# Patient Record
Sex: FEMALE | Race: BLACK OR AFRICAN AMERICAN | NOT HISPANIC OR LATINO | URBAN - METROPOLITAN AREA
[De-identification: names, ages, dates, MRNs, and addresses within clinical notes are randomized per-mention and may not be internally consistent; named-entity substitution may affect disease eponyms.]

---

## 2021-12-23 ENCOUNTER — OFFICE VISIT (OUTPATIENT)
Dept: URGENT CARE | Facility: CLINIC | Age: 31
End: 2021-12-23
Payer: COMMERCIAL

## 2021-12-23 VITALS
HEIGHT: 62 IN | HEART RATE: 82 BPM | WEIGHT: 210 LBS | BODY MASS INDEX: 38.64 KG/M2 | OXYGEN SATURATION: 98 % | TEMPERATURE: 98 F | RESPIRATION RATE: 16 BRPM

## 2021-12-23 DIAGNOSIS — Z11.59 SPECIAL SCREENING EXAMINATION FOR VIRAL DISEASE: Primary | ICD-10-CM

## 2021-12-23 PROCEDURE — 99203 OFFICE O/P NEW LOW 30 MIN: CPT | Performed by: FAMILY MEDICINE

## 2021-12-23 PROCEDURE — U0005 INFEC AGEN DETEC AMPLI PROBE: HCPCS | Performed by: FAMILY MEDICINE

## 2021-12-23 PROCEDURE — U0003 INFECTIOUS AGENT DETECTION BY NUCLEIC ACID (DNA OR RNA); SEVERE ACUTE RESPIRATORY SYNDROME CORONAVIRUS 2 (SARS-COV-2) (CORONAVIRUS DISEASE [COVID-19]), AMPLIFIED PROBE TECHNIQUE, MAKING USE OF HIGH THROUGHPUT TECHNOLOGIES AS DESCRIBED BY CMS-2020-01-R: HCPCS | Performed by: FAMILY MEDICINE

## 2021-12-24 LAB — SARS-COV-2 RNA RESP QL NAA+PROBE: POSITIVE

## 2022-08-16 ENCOUNTER — OFFICE VISIT (OUTPATIENT)
Dept: OBGYN CLINIC | Facility: CLINIC | Age: 32
End: 2022-08-16
Payer: COMMERCIAL

## 2022-08-16 VITALS — BODY MASS INDEX: 39.14 KG/M2 | SYSTOLIC BLOOD PRESSURE: 122 MMHG | WEIGHT: 214 LBS | DIASTOLIC BLOOD PRESSURE: 80 MMHG

## 2022-08-16 DIAGNOSIS — Z01.419 ENCNTR FOR GYN EXAM (GENERAL) (ROUTINE) W/O ABN FINDINGS: Primary | ICD-10-CM

## 2022-08-16 PROCEDURE — 99385 PREV VISIT NEW AGE 18-39: CPT | Performed by: NURSE PRACTITIONER

## 2022-08-16 PROCEDURE — 0503F POSTPARTUM CARE VISIT: CPT | Performed by: NURSE PRACTITIONER

## 2022-08-16 RX ORDER — DIPHENOXYLATE HYDROCHLORIDE AND ATROPINE SULFATE 2.5; .025 MG/1; MG/1
1 TABLET ORAL DAILY
COMMUNITY

## 2022-08-16 NOTE — PROGRESS NOTES
Assessment/Plan   Diagnoses and all orders for this visit:    Encntr for gyn exam (general) (routine) w/o abn findings  -     Thinprep Tis Pap and HPV mRNA E6/E7 Reflex HPV 16,18/45    Other orders  -     multivitamin (THERAGRAN) TABS; Take 1 tablet by mouth daily      Discussion  Reviewed with patient normal exam today  Pap with HPV done today  Reviewed can take a normal healthy couple up to one year to conceive  Recommend starting a prenatal with DHA  Discussed timing of intercourse with ovulation and OTC OPKs  Call with positive pregnancy test  Normal breast exam today  Monthly SBEs advised  Vitamin D and Calcim Supplements advised  Exercise most days of the week  Follow with PCP for regular check-ups and blood work  RTO 1 year for annual or sooner as needed  Subjective     Deepti Boles is a 32 y o  female new patient who presents for annual well woman exam    Last exam  2019 Pap normal denies any hx of abnormal pap smear in her past  Pap guidelines reviewed with patient  Pt would like Pap today  Pt denies any abnormal vaginal discharge, itching, or odor  Pt in a mutually exclusive relationship () with a male partner and denies the need for STD testing today  Menstrual Cycle:  LMP: 7/11/2022  Period Cycle (Days): 28  Period Duration (Days): 7  Period Pattern: Regular  Menstrual Flow: Light  Menstrual Control: Maxi pad  Dysmenorrhea: None  OB History     G 0   Contraception: None, TTC x 2 months  Practices monthly SBEs, no breast complaints today  Denies any bowel or bladder issues  Pt follows with PCP for regular check-ups and blood work  Review of Systems   Genitourinary: Negative  The following portions of the patient's history were reviewed and updated as appropriate: allergies, current medications, past family history, past medical history, past social history, past surgical history and problem list     History reviewed  No pertinent past medical history      Past Surgical History:   Procedure Laterality Date    NO PAST SURGERIES         History reviewed  No pertinent family history  Social History     Socioeconomic History    Marital status: /Civil Union     Spouse name: Not on file    Number of children: Not on file    Years of education: Not on file    Highest education level: Not on file   Occupational History    Not on file   Tobacco Use    Smoking status: Never Smoker    Smokeless tobacco: Never Used   Vaping Use    Vaping Use: Never used   Substance and Sexual Activity    Alcohol use: Not Currently    Drug use: Never    Sexual activity: Yes     Birth control/protection: None   Other Topics Concern    Not on file   Social History Narrative    Not on file     Social Determinants of Health     Financial Resource Strain: Not on file   Food Insecurity: Not on file   Transportation Needs: Not on file   Physical Activity: Not on file   Stress: Not on file   Social Connections: Not on file   Intimate Partner Violence: Not on file   Housing Stability: Not on file         Current Outpatient Medications:     multivitamin (THERAGRAN) TABS, Take 1 tablet by mouth daily, Disp: , Rfl:     No Known Allergies    Objective   Vitals:    08/16/22 1323   BP: 122/80   BP Location: Right arm   Patient Position: Sitting   Cuff Size: Large   Weight: 97 1 kg (214 lb)     Physical Exam  Vitals and nursing note reviewed  Constitutional:       Appearance: She is well-developed  HENT:      Head: Normocephalic  Neck:      Thyroid: No thyromegaly  Trachea: No tracheal deviation  Cardiovascular:      Rate and Rhythm: Normal rate and regular rhythm  Heart sounds: Normal heart sounds  Pulmonary:      Effort: Pulmonary effort is normal       Breath sounds: Normal breath sounds  Chest:   Breasts: Breasts are symmetrical       Right: No inverted nipple, mass, nipple discharge, skin change or tenderness        Left: No inverted nipple, mass, nipple discharge, skin change or tenderness  Abdominal:      General: Bowel sounds are normal  There is no distension  Palpations: Abdomen is soft  There is no mass  Tenderness: There is no abdominal tenderness  There is no guarding or rebound  Genitourinary:     Labia:         Right: No rash, tenderness, lesion or injury  Left: No rash, tenderness, lesion or injury  Vagina: Normal       Cervix: Normal       Uterus: Normal        Adnexa: Right adnexa normal and left adnexa normal         Right: No mass, tenderness or fullness  Left: No mass, tenderness or fullness  Musculoskeletal:         General: Normal range of motion  Cervical back: Normal range of motion and neck supple  Skin:     General: Skin is warm and dry  Neurological:      Mental Status: She is alert and oriented to person, place, and time  Psychiatric:         Behavior: Behavior normal          Thought Content:  Thought content normal          Judgment: Judgment normal

## 2022-08-18 LAB
CLINICAL INFO: NORMAL
CYTO CVX: NORMAL
CYTOLOGY CMNT CVX/VAG CYTO-IMP: NORMAL
DATE PREVIOUS BX: NORMAL
HPV E6+E7 MRNA CVX QL NAA+PROBE: NOT DETECTED
LMP START DATE: NORMAL
SL AMB PREV. PAP:: NORMAL
SPECIMEN SOURCE CVX/VAG CYTO: NORMAL

## 2022-10-12 ENCOUNTER — APPOINTMENT (OUTPATIENT)
Dept: RADIOLOGY | Facility: HOSPITAL | Age: 32
End: 2022-10-12
Payer: COMMERCIAL

## 2022-10-12 ENCOUNTER — HOSPITAL ENCOUNTER (EMERGENCY)
Facility: HOSPITAL | Age: 32
Discharge: HOME | End: 2022-10-12
Attending: EMERGENCY MEDICINE
Payer: COMMERCIAL

## 2022-10-12 VITALS
RESPIRATION RATE: 20 BRPM | DIASTOLIC BLOOD PRESSURE: 73 MMHG | HEART RATE: 78 BPM | TEMPERATURE: 97.1 F | SYSTOLIC BLOOD PRESSURE: 116 MMHG | OXYGEN SATURATION: 100 %

## 2022-10-12 DIAGNOSIS — M79.641 RIGHT HAND PAIN: Primary | ICD-10-CM

## 2022-10-12 LAB
EXT PREG TEST URINE: NEGATIVE
EXT. CONTROL ED NAV: NORMAL

## 2022-10-12 PROCEDURE — 73130 X-RAY EXAM OF HAND: CPT

## 2022-10-12 PROCEDURE — 81025 URINE PREGNANCY TEST: CPT | Performed by: PHYSICIAN ASSISTANT

## 2022-10-12 PROCEDURE — 99284 EMERGENCY DEPT VISIT MOD MDM: CPT | Performed by: PHYSICIAN ASSISTANT

## 2022-10-12 PROCEDURE — 99283 EMERGENCY DEPT VISIT LOW MDM: CPT

## 2022-10-12 NOTE — ED NOTES
Pt reported that she doesn't know if she is pregnant, preg test completed and it is negative  Xray made aware  Ronald SILVA notified        Doyle Aquino RN  10/12/22 6101

## 2022-10-12 NOTE — DISCHARGE INSTRUCTIONS
Motrin 400 mg every 6 hours with food as needed for pain     Follow up with hand orthopedist Dr Angelica Dominguez   Call for appointment    Use splint for comfort     Return to ED for increased pain, worsening symptoms

## 2022-10-12 NOTE — ED PROVIDER NOTES
History  Chief Complaint   Patient presents with   • Hand Pain     C/o R index finger pain for several months  Last night noted it more in hand as well with swelling, no known trauma     Patient is a 19-year-old black female with no pertinent past medical history who reports three month history of pain to the PIP joint the right 2nd finger  States she thought she has noticed some swelling to the right dorsal hand 2nd/3rd finger  webspace this morning  No trauma  No overuse  No fever  No other joint pain or swelling  Patient is right-hand dominant  She has no other complaints  Prior to Admission Medications   Prescriptions Last Dose Informant Patient Reported? Taking? Prenatal MV-Min-Fe Fum-FA-DHA (PRENATAL 1 PO)   Yes Yes   Sig: Take by mouth in the morning   multivitamin (THERAGRAN) TABS   Yes No   Sig: Take 1 tablet by mouth daily      Facility-Administered Medications: None       History reviewed  No pertinent past medical history  Past Surgical History:   Procedure Laterality Date   • NO PAST SURGERIES         History reviewed  No pertinent family history  I have reviewed and agree with the history as documented  E-Cigarette/Vaping   • E-Cigarette Use Never User      E-Cigarette/Vaping Substances   • Nicotine No    • THC No    • CBD No    • Flavoring No    • Other No    • Unknown No      Social History     Tobacco Use   • Smoking status: Never Smoker   • Smokeless tobacco: Never Used   Vaping Use   • Vaping Use: Never used   Substance Use Topics   • Alcohol use: Not Currently   • Drug use: Never       Review of Systems   Constitutional: Negative for chills and fever  HENT: Negative for ear pain and sore throat  Respiratory: Negative for cough and shortness of breath  Cardiovascular: Negative for chest pain and palpitations  Gastrointestinal: Negative for abdominal pain and vomiting  Genitourinary: Negative for dysuria and hematuria     Musculoskeletal: Positive for arthralgias  Negative for back pain and joint swelling  Skin: Negative for color change and rash  Neurological: Negative for numbness  All other systems reviewed and are negative  Physical Exam  Physical Exam  Vitals and nursing note reviewed  Constitutional:       General: She is not in acute distress  Appearance: Normal appearance  She is not ill-appearing, toxic-appearing or diaphoretic  HENT:      Head: Normocephalic and atraumatic  Nose: Nose normal       Mouth/Throat:      Mouth: Mucous membranes are moist       Pharynx: Oropharynx is clear  Eyes:      Extraocular Movements: Extraocular movements intact  Conjunctiva/sclera: Conjunctivae normal       Pupils: Pupils are equal, round, and reactive to light  Cardiovascular:      Rate and Rhythm: Normal rate and regular rhythm  Pulses: Normal pulses  Heart sounds: Normal heart sounds  Pulmonary:      Breath sounds: Normal breath sounds  Abdominal:      General: Abdomen is flat  Bowel sounds are normal       Palpations: Abdomen is soft  Musculoskeletal:      Cervical back: Normal range of motion and neck supple  Comments: Tenderness to palpation of the flexor aspect of the right 2nd MCP joint  There is no obvious right hand or fingers swelling, redness, warmth  Right hand is neurovascular intact  Skin:     General: Skin is warm and dry  Capillary Refill: Capillary refill takes less than 2 seconds  Neurological:      General: No focal deficit present  Mental Status: She is alert and oriented to person, place, and time  Mental status is at baseline           Vital Signs  ED Triage Vitals [10/12/22 1107]   Temperature Pulse Respirations Blood Pressure SpO2   (!) 97 1 °F (36 2 °C) 68 22 128/95 100 %      Temp Source Heart Rate Source Patient Position - Orthostatic VS BP Location FiO2 (%)   Tympanic Monitor Sitting Right arm --      Pain Score       No Pain           Vitals:    10/12/22 1107 10/12/22 1141 10/12/22 1234   BP: 128/95  116/73   Pulse: 68 74 78   Patient Position - Orthostatic VS: Sitting  Sitting         Visual Acuity      ED Medications  Medications - No data to display    Diagnostic Studies  Results Reviewed     Procedure Component Value Units Date/Time    POCT pregnancy, urine [[id number]]  (Normal) Resulted: 10/12/22 1129    Lab Status: Final result Updated: 10/12/22 1129     EXT PREG TEST UR (Ref: Negative) negative     Control valid                 XR hand 3+ views RIGHT   Final Result by Horacio Salomon MD (10/12 1256)      No acute osseous abnormality  Workstation performed: [id number]                    Procedures  Procedures         ED Course                               SBIRT 22yo+    Flowsheet Row Most Recent Value   SBIRT (23 yo +)    In order to provide better care to our patients, we are screening all of our patients for alcohol and drug use  Would it be okay to ask you these screening questions? No Filed at: 10/12/2022 1115                    MDM  Number of Diagnoses or Management Options  Right hand pain: new and requires workup  Diagnosis management comments: 31-year-old black female with 3 month history of pain to the right 2nd finger PIP joint  Presents with complaint of possible swelling to the right dorsal hand  On physical exam, patient is tender to the flexor aspect of the right 2nd MCP joint  Differential diagnosis includes tendinitis, arthritis  No acute osseous abnormality on x-ray    Patient was advised NSAIDs and will give Hand Ortho for follow-up       Amount and/or Complexity of Data Reviewed  Tests in the radiology section of CPT®: reviewed  Decide to obtain previous medical records or to obtain history from someone other than the patient: yes  Review and summarize past medical records: yes  Independent visualization of images, tracings, or specimens: yes    Risk of Complications, Morbidity, and/or Mortality  Presenting problems: low  Diagnostic procedures: low  Management options: low    Patient Progress  Patient progress: stable      Disposition  Final diagnoses:   Right hand pain     Time reflects when diagnosis was documented in both MDM as applicable and the Disposition within this note     Time User Action Codes Description Comment    10/12/2022 12:26 PM Jessica Sid Murray [[id number]] Right hand pain       ED Disposition     ED Disposition   Discharge    Condition   Stable    Date/Time   Wed Oct 12, 2022 12:25 PM    Anastacio Mccray discharge to home/self care  Follow-up Information     Follow up With Specialties Details Why [address], MD Orthopedic Surgery, Hand Surgery   [address]  [phone]            Discharge Medication List as of 10/12/2022 12:26 PM      CONTINUE these medications which have NOT CHANGED    Details   Prenatal MV-Min-Fe Fum-FA-DHA (PRENATAL 1 PO) Take by mouth in the morning, Historical Med      multivitamin (THERAGRAN) TABS Take 1 tablet by mouth daily, Historical Med             No discharge procedures on file      PDMP Review     None          ED Provider  Electronically Signed by           Maurisio Cedillo PA-C  10/12/22 9703

## 2024-12-16 ENCOUNTER — OFFICE VISIT (OUTPATIENT)
Dept: FAMILY MEDICINE CLINIC | Facility: CLINIC | Age: 34
End: 2024-12-16
Payer: COMMERCIAL

## 2024-12-16 VITALS
BODY MASS INDEX: 42.63 KG/M2 | SYSTOLIC BLOOD PRESSURE: 126 MMHG | HEIGHT: 61 IN | DIASTOLIC BLOOD PRESSURE: 82 MMHG | TEMPERATURE: 97.8 F | WEIGHT: 225.8 LBS | HEART RATE: 84 BPM | RESPIRATION RATE: 18 BRPM

## 2024-12-16 DIAGNOSIS — Z00.00 ROUTINE ADULT HEALTH MAINTENANCE: Primary | ICD-10-CM

## 2024-12-16 DIAGNOSIS — Z13.0 SCREENING FOR DEFICIENCY ANEMIA: ICD-10-CM

## 2024-12-16 DIAGNOSIS — Z13.6 SCREENING FOR CARDIOVASCULAR CONDITION: ICD-10-CM

## 2024-12-16 DIAGNOSIS — Z11.59 NEED FOR HEPATITIS C SCREENING TEST: ICD-10-CM

## 2024-12-16 DIAGNOSIS — Z13.1 SCREENING FOR DIABETES MELLITUS: ICD-10-CM

## 2024-12-16 DIAGNOSIS — Z13.29 SCREENING FOR THYROID DISORDER: ICD-10-CM

## 2024-12-16 PROCEDURE — 99385 PREV VISIT NEW AGE 18-39: CPT | Performed by: NURSE PRACTITIONER

## 2024-12-16 RX ORDER — FOLIC ACID/INOSITOL 200-2000MG
POWDER IN PACKET (EA) ORAL
COMMUNITY

## 2024-12-16 RX ORDER — UBIDECARENONE 200 MG
CAPSULE ORAL
COMMUNITY

## 2024-12-16 NOTE — PATIENT INSTRUCTIONS
"Patient Education     Routine physical for adults   The Basics   Written by the doctors and editors at [facility]   What is a physical? -- A physical is a routine visit, or \"check-up,\" with your doctor. You might also hear it called a \"wellness visit\" or \"preventive visit.\"  During each visit, the doctor will:   Ask about your physical and mental health   Ask about your habits, behaviors, and lifestyle   Do an exam   Give you vaccines if needed   Talk to you about any medicines you take   Give advice about your health   Answer your questions  Getting regular check-ups is an important part of taking care of your health. It can help your doctor find and treat any problems you have. But it's also important for preventing health problems.  A routine physical is different from a \"sick visit.\" A sick visit is when you see a doctor because of a health concern or problem. Since physicals are scheduled ahead of time, you can think about what you want to ask the doctor.  How often should I get a physical? -- It depends on your age and health. In general, for people age 21 years and older:   If you are younger than 50 years, you might be able to get a physical every 3 years.   If you are 50 years or older, your doctor might recommend a physical every year.  If you have an ongoing health condition, like diabetes or high blood pressure, your doctor will probably want to see you more often.  What happens during a physical? -- In general, each visit will include:   Physical exam - The doctor or nurse will check your height, weight, heart rate, and blood pressure. They will also look at your eyes and ears. They will ask about how you are feeling and whether you have any symptoms that bother you.   Medicines - It's a good idea to bring a list of all the medicines you take to each doctor visit. Your doctor will talk to you about your medicines and answer any questions. Tell them if you are having any side effects that bother you. You " "should also tell them if you are having trouble paying for any of your medicines.   Habits and behaviors - This includes:   Your diet   Your exercise habits   Whether you smoke, drink alcohol, or use drugs   Whether you are sexually active   Whether you feel safe at home  Your doctor will talk to you about things you can do to improve your health and lower your risk of health problems. They will also offer help and support. For example, if you want to quit smoking, they can give you advice and might prescribe medicines. If you want to improve your diet or get more physical activity, they can help you with this, too.   Lab tests, if needed - The tests you get will depend on your age and situation. For example, your doctor might want to check your:   Cholesterol   Blood sugar   Iron level   Vaccines - The recommended vaccines will depend on your age, health, and what vaccines you already had. Vaccines are very important because they can prevent certain serious or deadly infections.   Discussion of screening - \"Screening\" means checking for diseases or other health problems before they cause symptoms. Your doctor can recommend screening based on your age, risk, and preferences. This might include tests to check for:   Cancer, such as breast, prostate, cervical, ovarian, colorectal, prostate, lung, or skin cancer   Sexually transmitted infections, such as chlamydia and gonorrhea   Mental health conditions like depression and anxiety  Your doctor will talk to you about the different types of screening tests. They can help you decide which screenings to have. They can also explain what the results might mean.   Answering questions - The physical is a good time to ask the doctor or nurse questions about your health. If needed, they can refer you to other doctors or specialists, too.  Adults older than 65 years often need other care, too. As you get older, your doctor will talk to you about:   How to prevent falling at " home   Hearing or vision tests   Memory testing   How to take your medicines safely   Making sure that you have the help and support you need at home  All topics are updated as new evidence becomes available and our peer review process is complete.  This topic retrieved from [employer] on: May 02, 2024.  Topic 899515 Version 1.0  Release: 32.4.3 - C32.122  © 2024 UpToDate, Inc. and/or its affiliates. All rights reserved.  Consumer Information Use and Disclaimer   Disclaimer: This generalized information is a limited summary of diagnosis, treatment, and/or medication information. It is not meant to be comprehensive and should be used as a tool to help the user understand and/or assess potential diagnostic and treatment options. It does NOT include all information about conditions, treatments, medications, side effects, or risks that may apply to a specific patient. It is not intended to be medical advice or a substitute for the medical advice, diagnosis, or treatment of a health care provider based on the health care provider's examination and assessment of a patient's specific and unique circumstances. Patients must speak with a health care provider for complete information about their health, medical questions, and treatment options, including any risks or benefits regarding use of medications. This information does not endorse any treatments or medications as safe, effective, or approved for treating a specific patient. UpToDate, Inc. and its affiliates disclaim any warranty or liability relating to this information or the use thereof.The use of this information is governed by the Terms of Use, available at https://www.woltersIntenseuwer.com/en/know/clinical-effectiveness-terms. 2024© UpToDate, Inc. and its affiliates and/or licensors. All rights reserved.  Copyright   © 2024 Up[employer]Date, Inc. and/or its affiliates. All rights reserved.

## 2024-12-16 NOTE — PROGRESS NOTES
Adult Annual Physical  Name: Catrina Lewis      : 1990      MRN: [id number]  Encounter Provider: MECHE Valdez  Encounter Date: 2024   Encounter department: [facility]  Chief Complaint   Patient presents with   • Establish Care     Noemy Caruso MA       Assessment & Plan  Routine adult health maintenance         Screening for diabetes mellitus    Orders:  •  Comprehensive metabolic panel; Future    Screening for deficiency anemia    Orders:  •  CBC; Future    Screening for thyroid disorder    Orders:  •  TSH, 3rd generation with Free T4 reflex; Future    Screening for cardiovascular condition    Orders:  •  Lipid Panel with Direct LDL reflex; Future    Need for hepatitis C screening test    Orders:  •  Hepatitis C antibody; Future    Immunizations and preventive care screenings were discussed with patient today. Appropriate education was printed on patient's after visit summary.    Counseling:  Alcohol/drug use: discussed moderation in alcohol intake, the recommendations for healthy alcohol use, and avoidance of illicit drug use.  Dental Health: discussed importance of regular tooth brushing, flossing, and dental visits.  Injury prevention: discussed safety/seat belts, safety helmets, smoke detectors, carbon monoxide detectors, and smoking near bedding or upholstery.  Sexual health: discussed sexually transmitted diseases, partner selection, use of condoms, avoidance of unintended pregnancy, and contraceptive alternatives.  Exercise: the importance of regular exercise/physical activity was discussed. Recommend exercise 3-5 times per week for at least 30 minutes.          History of Present Illness     Adult Annual Physical:  Patient presents for annual physical. New to practice.  Personal and family medical history reviewed  Under care of gynecologist at [place] and had hysteroscopy couple of months for uterine polyp.  Counseled to follow with dentist  Stated that uptodate on  "Tdap vaccine.     Diet and Physical Activity:  - Diet/Nutrition: well balanced diet.  - Exercise: no formal exercise.    Depression Screening:  - PHQ-2 Score: 0    General Health:  - Sleep: sleeps well.  - Hearing: normal hearing bilateral ears.  - Vision: no vision problems, goes for regular eye exams and wears glasses.  - Dental: no dental visits for > 1 year and brushes teeth twice daily. occasioinally floss    /GYN Health:  - Follows with GYN: yes.     Review of Systems   Constitutional: Negative.    HENT: Negative.     Eyes: Negative.    Respiratory: Negative.     Cardiovascular: Negative.    Gastrointestinal: Negative.    Endocrine: Negative.    Genitourinary: Negative.    Musculoskeletal: Negative.    Skin: Negative.    Allergic/Immunologic: Negative.    Neurological: Negative.    Hematological: Negative.    Psychiatric/Behavioral: Negative.       Current Outpatient Medications on File Prior to Visit   Medication Sig Dispense Refill   • Coenzyme Q10 (CoQ-10) 200 MG CAPS      • Inositol-Folic Acid (Pregnitude) 2000-200 MG-MCG PACK      • Prenatal MV-Min-Fe Fum-FA-DHA (PRENATAL 1 PO) Take by mouth in the morning     • [DISCONTINUED] multivitamin (THERAGRAN) TABS Take 1 tablet by mouth daily       No current facility-administered medications on file prior to visit.        Objective   /82   Pulse 84   Temp 97.8 °F (36.6 °C)   Resp 18   Ht 5' 1.25\" (1.556 m)   Wt 102 kg (225 lb 12.8 oz)   BMI 42.32 kg/m²   Vision Screening    Right eye Left eye Both eyes   Without correction      With correction 20/15 20/15 20/20      Physical Exam  Vitals and nursing note reviewed.   Constitutional:       General: She is not in acute distress.     Appearance: She is well-developed. She is obese.   HENT:      Head: Normocephalic and atraumatic.      Salivary Glands: Right salivary gland is not diffusely enlarged or tender. Left salivary gland is not diffusely enlarged or tender.      Right Ear: Tympanic membrane, ear " canal and external ear normal.      Left Ear: Tympanic membrane, ear canal and external ear normal.      Nose: Nose normal. No nasal tenderness or mucosal edema.      Right Sinus: No maxillary sinus tenderness or frontal sinus tenderness.      Left Sinus: No maxillary sinus tenderness or frontal sinus tenderness.      Mouth/Throat:      Lips: Pink.      Mouth: Mucous membranes are moist.      Dentition: Normal dentition. No dental tenderness.      Tongue: No lesions.      Pharynx: Oropharynx is clear. No pharyngeal swelling, oropharyngeal exudate, posterior oropharyngeal erythema or uvula swelling.   Eyes:      General: Lids are normal.         Right eye: No discharge or hordeolum.         Left eye: No discharge or hordeolum.      Conjunctiva/sclera: Conjunctivae normal.      Right eye: Right conjunctiva is not injected.      Left eye: Left conjunctiva is not injected.   Neck:      Thyroid: No thyromegaly or thyroid tenderness.   Cardiovascular:      Rate and Rhythm: Normal rate and regular rhythm.      Pulses: Normal pulses.      Heart sounds: Normal heart sounds, S1 normal and S2 normal. No murmur heard.  Pulmonary:      Effort: Pulmonary effort is normal. No respiratory distress.      Breath sounds: Normal breath sounds.   Chest:      Chest wall: No mass, lacerations, deformity, swelling, tenderness, crepitus or edema. There is no dullness to percussion.   Abdominal:      General: Abdomen is flat. Bowel sounds are normal.      Palpations: Abdomen is soft.      Tenderness: There is no abdominal tenderness.      Hernia: There is no hernia in the umbilical area or ventral area.   Genitourinary:     Comments:  and breast exam deferred as follows with gynecologist  Musculoskeletal:         General: No swelling or tenderness. Normal range of motion.      Cervical back: Neck supple.      Right lower leg: No edema.      Left lower leg: No edema.   Lymphadenopathy:      Cervical:      Right cervical: No superficial or  posterior cervical adenopathy.     Left cervical: No superficial or posterior cervical adenopathy.      Upper Body:      Right upper body: No supraclavicular or axillary adenopathy.      Left upper body: No supraclavicular or axillary adenopathy.   Skin:     General: Skin is warm and dry.   Neurological:      General: No focal deficit present.      Mental Status: She is alert and oriented to person, place, and time.      GCS: GCS eye subscore is 4. GCS verbal subscore is 5. GCS motor subscore is 6.      Sensory: Sensation is intact.      Motor: Motor function is intact.      Coordination: Coordination is intact.      Gait: Gait is intact.      Deep Tendon Reflexes: Reflexes are normal and symmetric.   Psychiatric:         Attention and Perception: Attention normal.         Mood and Affect: Mood normal.         Speech: Speech normal.         Behavior: Behavior normal. Behavior is cooperative.         Thought Content: Thought content normal.         Cognition and Memory: Cognition normal.         Judgment: Judgment normal.

## 2024-12-25 LAB
ALBUMIN SERPL-MCNC: 4.4 G/DL (ref 3.9–4.9)
ALP SERPL-CCNC: 111 IU/L (ref 44–121)
ALT SERPL-CCNC: 26 IU/L (ref 0–32)
AST SERPL-CCNC: 26 IU/L (ref 0–40)
BILIRUB SERPL-MCNC: 0.4 MG/DL (ref 0–1.2)
BUN SERPL-MCNC: 12 MG/DL (ref 6–20)
BUN/CREAT SERPL: 12 (ref 9–23)
CALCIUM SERPL-MCNC: 9.6 MG/DL (ref 8.7–10.2)
CHLORIDE SERPL-SCNC: 104 MMOL/L (ref 96–106)
CHOLEST SERPL-MCNC: 278 MG/DL (ref 100–199)
CO2 SERPL-SCNC: 24 MMOL/L (ref 20–29)
CREAT SERPL-MCNC: 1.02 MG/DL (ref 0.57–1)
EGFR: 74 ML/MIN/1.73
ERYTHROCYTE [DISTWIDTH] IN BLOOD BY AUTOMATED COUNT: 12.3 % (ref 11.7–15.4)
GLOBULIN SER-MCNC: 3.1 G/DL (ref 1.5–4.5)
GLUCOSE SERPL-MCNC: 109 MG/DL (ref 70–99)
HCT VFR BLD AUTO: 41 % (ref 34–46.6)
HCV AB S/CO SERPL IA: NON REACTIVE
HDLC SERPL-MCNC: 45 MG/DL
HGB BLD-MCNC: 13.2 G/DL (ref 11.1–15.9)
LDL CALC COMMENT: ABNORMAL
LDLC SERPL CALC-MCNC: 203 MG/DL (ref 0–99)
LDLC/HDLC SERPL: 4.5 RATIO (ref 0–3.2)
MCH RBC QN AUTO: 28.8 PG (ref 26.6–33)
MCHC RBC AUTO-ENTMCNC: 32.2 G/DL (ref 31.5–35.7)
MCV RBC AUTO: 89 FL (ref 79–97)
MICRODELETION SYND BLD/T FISH: NORMAL
PLATELET # BLD AUTO: 295 X10E3/UL (ref 150–450)
POTASSIUM SERPL-SCNC: 4.2 MMOL/L (ref 3.5–5.2)
PROT SERPL-MCNC: 7.5 G/DL (ref 6–8.5)
RBC # BLD AUTO: 4.59 X10E6/UL (ref 3.77–5.28)
SL AMB T4, FREE (DIRECT): 1.19 NG/DL (ref 0.82–1.77)
SL AMB VLDL CHOLESTEROL CALC: 30 MG/DL (ref 5–40)
SODIUM SERPL-SCNC: 142 MMOL/L (ref 134–144)
TRIGL SERPL-MCNC: 158 MG/DL (ref 0–149)
TSH SERPL DL<=0.005 MIU/L-ACNC: 4.68 UIU/ML (ref 0.45–4.5)
WBC # BLD AUTO: 6.5 X10E3/UL (ref 3.4–10.8)

## 2024-12-26 ENCOUNTER — RESULTS FOLLOW-UP (OUTPATIENT)
Dept: FAMILY MEDICINE CLINIC | Facility: CLINIC | Age: 34
End: 2024-12-26

## 2024-12-26 DIAGNOSIS — R79.89 ELEVATED SERUM CREATININE: ICD-10-CM

## 2024-12-26 DIAGNOSIS — R79.89 ELEVATED TSH: Primary | ICD-10-CM

## 2024-12-26 DIAGNOSIS — E78.5 DYSLIPIDEMIA: ICD-10-CM

## 2024-12-26 RX ORDER — LEVOTHYROXINE SODIUM 25 UG/1
25 TABLET ORAL
Qty: 100 TABLET | Refills: 3 | Status: SHIPPED | OUTPATIENT
Start: 2024-12-26

## 2025-02-07 ENCOUNTER — OFFICE VISIT (OUTPATIENT)
Dept: URGENT CARE | Facility: CLINIC | Age: 35
End: 2025-02-07
Payer: COMMERCIAL

## 2025-02-07 VITALS
SYSTOLIC BLOOD PRESSURE: 114 MMHG | HEART RATE: 80 BPM | DIASTOLIC BLOOD PRESSURE: 74 MMHG | OXYGEN SATURATION: 100 % | WEIGHT: 227 LBS | TEMPERATURE: 97.4 F | RESPIRATION RATE: 16 BRPM | BODY MASS INDEX: 42.54 KG/M2

## 2025-02-07 DIAGNOSIS — J02.9 SORE THROAT: Primary | ICD-10-CM

## 2025-02-07 LAB — S PYO AG THROAT QL: NEGATIVE

## 2025-02-07 PROCEDURE — 87880 STREP A ASSAY W/OPTIC: CPT | Performed by: PREVENTIVE MEDICINE

## 2025-02-07 PROCEDURE — 99213 OFFICE O/P EST LOW 20 MIN: CPT | Performed by: PREVENTIVE MEDICINE

## 2025-02-07 NOTE — PROGRESS NOTES
[facility]        NAME: Catrina Lewis is a 34 y.o. female  : 1990    MRN: [id number]  DATE: 2025  TIME: 8:33 AM    Assessment and Plan   Sore throat [J02.9]  1. Sore throat  POCT rapid ANTIGEN strepA            Patient Instructions       Follow up with PCP in 3-5 days.  Proceed to  ER if symptoms worsen.    If tests have been performed at [facility], our office will contact you with results if changes need to be made to the care plan discussed with you at the visit.  [facility]    Chief Complaint     Chief Complaint   Patient presents with    Sore Throat     Pt c/o sore throat for 1 week         History of Present Illness       Sore throat x 1 week.  Very minimal cough no fever    Sore Throat         Review of Systems   Review of Systems   HENT:  Positive for sore throat.          Current Medications       Current Outpatient Medications:     Inositol-Folic Acid (Pregnitude) 2000-200 MG-MCG PACK, , Disp: , Rfl:     levothyroxine 25 mcg tablet, Take 1 tablet (25 mcg total) by mouth daily in the early morning, Disp: 100 tablet, Rfl: 3    Prenatal MV-Min-Fe Fum-FA-DHA (PRENATAL 1 PO), Take by mouth in the morning, Disp: , Rfl:     Coenzyme Q10 (CoQ-10) 200 MG CAPS, , Disp: , Rfl:     Current Allergies     Allergies as of 2025    (No Known Allergies)            The following portions of the patient's history were reviewed and updated as appropriate: allergies, current medications, past family history, past medical history, past social history, past surgical history and problem list.     Past Medical History:   Diagnosis Date    No pertinent past medical history        Past Surgical History:   Procedure Laterality Date    HYSTEROSCOPY      NO PAST SURGERIES         Family History   Problem Relation Age of Onset    No Known Problems Mother     Hypertension Father     Diabetes Father     Other (smoker) Father     Fibroids Sister           Medications have been verified.        Objective   /74   Pulse 80   Temp (!) 97.4 °F (36.3 °C) (Temporal)   Resp 16   Wt 103 kg (227 lb)   SpO2 100%   BMI 42.54 kg/m²   No LMP recorded.       Physical Exam     Physical Exam  HENT:      Mouth/Throat:      Mouth: Mucous membranes are moist. No oral lesions.      Pharynx: Oropharynx is clear. Posterior oropharyngeal erythema present. No pharyngeal swelling, oropharyngeal exudate or uvula swelling.     Rapid strep negative

## 2025-04-14 ENCOUNTER — OFFICE VISIT (OUTPATIENT)
Dept: CARDIOLOGY CLINIC | Facility: CLINIC | Age: 35
End: 2025-04-14
Payer: COMMERCIAL

## 2025-04-14 VITALS
HEIGHT: 61 IN | OXYGEN SATURATION: 98 % | SYSTOLIC BLOOD PRESSURE: 114 MMHG | BODY MASS INDEX: 43.61 KG/M2 | WEIGHT: 231 LBS | DIASTOLIC BLOOD PRESSURE: 68 MMHG | HEART RATE: 81 BPM

## 2025-04-14 DIAGNOSIS — R06.02 EXERTIONAL SHORTNESS OF BREATH: Primary | ICD-10-CM

## 2025-04-14 DIAGNOSIS — E78.5 HYPERLIPIDEMIA, UNSPECIFIED HYPERLIPIDEMIA TYPE: ICD-10-CM

## 2025-04-14 DIAGNOSIS — E78.2 ELEVATED TRIGLYCERIDES WITH HIGH CHOLESTEROL: ICD-10-CM

## 2025-04-14 PROCEDURE — 93000 ELECTROCARDIOGRAM COMPLETE: CPT | Performed by: INTERNAL MEDICINE

## 2025-04-14 PROCEDURE — 99203 OFFICE O/P NEW LOW 30 MIN: CPT | Performed by: INTERNAL MEDICINE

## 2025-04-14 RX ORDER — OMEGA-3-ACID ETHYL ESTERS 1 G/1
2 CAPSULE, LIQUID FILLED ORAL DAILY
Qty: 360 CAPSULE | Refills: 1 | Status: SHIPPED | OUTPATIENT
Start: 2025-04-14

## 2025-04-14 RX ORDER — OMEGA-3S/DHA/EPA/FISH OIL/D3 300MG-1000
400 CAPSULE ORAL DAILY
COMMUNITY

## 2025-04-14 RX ORDER — ATORVASTATIN CALCIUM 20 MG/1
20 TABLET, FILM COATED ORAL
Qty: 90 TABLET | Refills: 3 | Status: SHIPPED | OUTPATIENT
Start: 2025-04-14

## 2025-04-14 NOTE — PROGRESS NOTES
[facility]  [id number]   [place] [address]  Cardiology Consultation    Catrina Lewis  [id number]  1990      Consult for: Hyperlipidemia  Appreciate consult by: MECHE Valdez    1. Exertional shortness of breath        2. Hyperlipidemia, unspecified hyperlipidemia type  POCT ECG    atorvastatin (LIPITOR) 20 mg tablet    omega-3-acid ethyl esters (LOVAZA) 1 g capsule      3. Elevated triglycerides with high cholesterol  atorvastatin (LIPITOR) 20 mg tablet    omega-3-acid ethyl esters (LOVAZA) 1 g capsule    Stress test only, exercise    Lipid Panel With Apolipoprotein B (ApoB)    Lipid Panel With Apolipoprotein B (ApoB)         Discussion/Summary:   Assessment  - Elevated LDL cholesterol, likely familial hypercholesterolemia  - Discussed potential for genetic testing and familial implications  - Plan for aggressive cholesterol management    Recommendations  - Start atorvastatin 20 mg daily  - Increase omega-3 intake to 4,000 mg/day, consider prescription-grade if affordable  - Reduce carbohydrate intake to less than 100 grams per day  - Implement dietary changes and lifestyle modifications  - Schedule a stress test to assess cardiovascular response to exercise  - Follow up in six months to re-evaluate lipid levels and monitor treatment response  - Discuss potential impact on conception and pregnancy management, ensuring no medication interactions with current supplements    HPI:   The patient reports a history of elevated cholesterol, with the most recent LDL cholesterol measured at 203 mg/dL. They mention that their cholesterol has always been high, even when previously athletic. The patient has a sedentary job and reports a lack of exercise due to schedule constraints and fatigue. They do not smoke or consume alcohol regularly. The patient follows a diet with a high intake of trans fats, common in [place] cuisine, and has been trying to improve  dietary habits. There is no history of sleep apnea symptoms, leg swelling, or significant chest discomfort with routine activities, though they experience mild shortness of breath when running or walking fast on a treadmill.    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  - No smoking history  - Minimal alcohol consumption        Family Hx  Father- htn- 40s    Past Medical History:   Diagnosis Date    No pertinent past medical history      Social History     Socioeconomic History    Marital status: /Civil Union     Spouse name: Not on file    Number of children: Not on file    Years of education: Not on file    Highest education level: Not on file   Occupational History    Not on file   Tobacco Use    Smoking status: Never    Smokeless tobacco: Never   Vaping Use    Vaping status: Never Used   Substance and Sexual Activity    Alcohol use: Not Currently    Drug use: Never    Sexual activity: Yes     Birth control/protection: None   Other Topics Concern    Not on file   Social History Narrative    Not on file     Social Drivers of Health     Financial Resource Strain: Not on file   Food Insecurity: Not on file   Transportation Needs: Not on file   Physical Activity: Not on file   Stress: Not on file   Social Connections: Not on file   Intimate Partner Violence: Not on file   Housing Stability: Not on file      Family History   Problem Relation Age of Onset    No Known Problems Mother     Hypertension Father     Diabetes Father     Other (smoker) Father     Fibroids Sister      Past Surgical History:   Procedure Laterality Date    HYSTEROSCOPY      NO PAST SURGERIES         Current Outpatient Medications:     Coenzyme Q10 (CoQ-10) 200 MG CAPS, , Disp: , Rfl:     Inositol-Folic Acid (Pregnitude) 2000-200 MG-MCG PACK, , Disp: , Rfl:     levothyroxine 25 mcg tablet, Take 1 tablet (25 mcg total) by mouth daily in the early morning, Disp: 100 tablet, Rfl: 3    Multiple Vitamins-Minerals  "(ZINC PO), Take by mouth, Disp: , Rfl:     Prenatal MV-Min-Fe Fum-FA-DHA (PRENATAL 1 PO), Take by mouth in the morning, Disp: , Rfl:     cholecalciferol (VITAMIN D3) 400 units tablet, Take 400 Units by mouth daily, Disp: , Rfl:   No Known Allergies  Vitals:    04/14/25 1500   BP: 114/68   BP Location: Right arm   Patient Position: Sitting   Cuff Size: Large   Pulse: 81   SpO2: 98%   Weight: 105 kg (231 lb)   Height: 5' 1.25\" (1.556 m)       Review of Systems:   Review of Systems   Constitutional: Negative.  Negative for activity change, appetite change, chills, diaphoresis, fatigue, fever and unexpected weight change.   HENT: Negative.  Negative for congestion, dental problem, drooling, ear discharge, ear pain, facial swelling, hearing loss, mouth sores, nosebleeds, postnasal drip, rhinorrhea, sinus pressure, sinus pain, sneezing, sore throat, tinnitus, trouble swallowing and voice change.    Eyes: Negative.  Negative for photophobia, pain, redness, itching and visual disturbance.   Respiratory:  Positive for shortness of breath. Negative for apnea, cough, choking, chest tightness, wheezing and stridor.    Cardiovascular: Negative.  Negative for chest pain, palpitations and leg swelling.   Gastrointestinal: Negative.  Negative for abdominal distention, abdominal pain, anal bleeding, blood in stool, constipation, diarrhea, nausea, rectal pain and vomiting.   Endocrine: Negative.  Negative for cold intolerance, heat intolerance, polydipsia, polyphagia and polyuria.   Genitourinary: Negative.  Negative for decreased urine volume, difficulty urinating, dyspareunia, dysuria, enuresis, flank pain, frequency, genital sores, hematuria, menstrual problem, pelvic pain, urgency, vaginal bleeding, vaginal discharge and vaginal pain.   Musculoskeletal: Negative.  Negative for arthralgias, back pain, gait problem, joint swelling, myalgias, neck pain and neck stiffness.   Skin: Negative.  Negative for color change, pallor, rash " "and wound.   Allergic/Immunologic: Negative.  Negative for environmental allergies, food allergies and immunocompromised state.   Neurological: Negative.  Negative for dizziness, tremors, seizures, syncope, facial asymmetry, speech difficulty, weakness, light-headedness, numbness and headaches.   Hematological: Negative.  Negative for adenopathy. Does not bruise/bleed easily.   Psychiatric/Behavioral: Negative.  Negative for agitation, behavioral problems, confusion, decreased concentration, dysphoric mood, hallucinations, self-injury, sleep disturbance and suicidal ideas. The patient is not nervous/anxious and is not hyperactive.    All other systems reviewed and are negative.      Vitals:    04/14/25 1500   BP: 114/68   BP Location: Right arm   Patient Position: Sitting   Cuff Size: Large   Pulse: 81   SpO2: 98%   Weight: 105 kg (231 lb)   Height: 5' 1.25\" (1.556 m)     Physical Examination:   Physical Exam  Constitutional:       General: She is not in acute distress.     Appearance: She is well-developed. She is not diaphoretic.   HENT:      Head: Normocephalic and atraumatic.      Right Ear: External ear normal.      Left Ear: External ear normal.   Eyes:      General: No scleral icterus.        Right eye: No discharge.         Left eye: No discharge.      Conjunctiva/sclera: Conjunctivae normal.      Pupils: Pupils are equal, round, and reactive to light.   Neck:      Thyroid: No thyromegaly.      Vascular: No JVD.      Trachea: No tracheal deviation.   Cardiovascular:      Rate and Rhythm: Normal rate and regular rhythm.      Heart sounds: No murmur heard.     No friction rub. No gallop.   Pulmonary:      Effort: Pulmonary effort is normal. No respiratory distress.      Breath sounds: Normal breath sounds. No stridor. No wheezing or rales.   Chest:      Chest wall: No tenderness.   Abdominal:      General: Bowel sounds are normal. There is no distension.      Palpations: Abdomen is soft. There is no mass.      " "Tenderness: There is no abdominal tenderness. There is no guarding or rebound.   Musculoskeletal:         General: No tenderness or deformity. Normal range of motion.      Cervical back: Normal range of motion and neck supple.   Skin:     General: Skin is warm and dry.      Coloration: Skin is not pale.      Findings: No erythema or rash.   Neurological:      Mental Status: She is alert and oriented to person, place, and time.      Cranial Nerves: No cranial nerve deficit.      Motor: No abnormal muscle tone.      Coordination: Coordination normal.      Deep Tendon Reflexes: Reflexes are normal and symmetric. Reflexes normal.   Psychiatric:         Behavior: Behavior normal.         Thought Content: Thought content normal.         Judgment: Judgment normal.         Labs:     Lab Results   Component Value Date    WBC 6.5 12/24/2024    HGB 13.2 12/24/2024    HCT 41.0 12/24/2024    MCV 89 12/24/2024    RDW 12.3 12/24/2024     12/24/2024     BMP:  Lab Results   Component Value Date    SODIUM 142 12/24/2024    K 4.2 12/24/2024     12/24/2024    CO2 24 12/24/2024    BUN 12 12/24/2024    CREATININE 1.02 (H) 12/24/2024    GLUC 109 (H) 12/24/2024    EGFR 74 12/24/2024     LFT:  Lab Results   Component Value Date    AST 26 12/24/2024    ALT 26 12/24/2024    TP 7.5 12/24/2024    ALB 4.4 12/24/2024      No results found for: \"[id number]\"  No results found for: \"HGBA1C\"  Lipid Profile:   Lab Results   Component Value Date    CHOLESTEROL 278 (H) 12/24/2024    HDL 45 12/24/2024    LDLCALC 203 (H) 12/24/2024    TRIG 158 (H) 12/24/2024     Lab Results   Component Value Date    CHOLESTEROL 278 (H) 12/24/2024     No results found for: \"CKTOTAL\", \"CKMB\", \"CKMBINDEX\", \"TROPONINI\"  No results found for: \"NTBNP\"   No results found for this or any previous visit (from the past 4 weeks).    Imaging & Testing   I have personally reviewed pertinent reports.      Cardiac Testing         EKG: Personally reviewed.    Normal sinus " "rhythm no acute ST changes    Torrey Red MD [facility]  [phone]  Please call with any questions or suggestions    Counseling :  A description of the counseling:   Goals and Barriers:  Patient's ability to self care:  Medication side effect reviewed with patient in detail and all their questions answered.    \"Portions of the record may have been created with voice recognition software. Occasional wrong word or \"sound a like\" substitutions may have occurred due to the inherent limitations of voice recognition software. Read the chart carefully and recognize, using context, where substitutions have occurred. Please call if you have any questions. \"    "

## 2025-05-02 ENCOUNTER — RESULTS FOLLOW-UP (OUTPATIENT)
Dept: CARDIOLOGY CLINIC | Facility: CLINIC | Age: 35
End: 2025-05-02

## 2025-05-02 ENCOUNTER — HOSPITAL ENCOUNTER (OUTPATIENT)
Dept: NON INVASIVE DIAGNOSTICS | Facility: HOSPITAL | Age: 35
Discharge: HOME | End: 2025-05-02
Attending: INTERNAL MEDICINE
Payer: COMMERCIAL

## 2025-05-02 VITALS
RESPIRATION RATE: 20 BRPM | DIASTOLIC BLOOD PRESSURE: 80 MMHG | HEART RATE: 100 BPM | OXYGEN SATURATION: 96 % | SYSTOLIC BLOOD PRESSURE: 120 MMHG

## 2025-05-02 DIAGNOSIS — E78.2 ELEVATED TRIGLYCERIDES WITH HIGH CHOLESTEROL: ICD-10-CM

## 2025-05-02 LAB
CHEST PAIN STATEMENT: NORMAL
MAX DIASTOLIC BP: 80 MMHG
MAX HR PERCENT: 93 %
MAX HR: 173 BPM
MAX PREDICTED HEART RATE: 186 BPM
PROTOCOL NAME: NORMAL
RATE PRESSURE PRODUCT: NORMAL
REASON FOR TERMINATION: NORMAL
SL CV STRESS RECOVERY BP: NORMAL MMHG
SL CV STRESS RECOVERY HR: 105 BPM
SL CV STRESS RECOVERY O2 SAT: 96 %
SL CV STRESS STAGE REACHED: 2
STRESS ANGINA INDEX: 0
STRESS BASELINE BP: NORMAL MMHG
STRESS BASELINE HR: 100 BPM
STRESS O2 SAT REST: 96 %
STRESS PEAK HR: 171 BPM
STRESS POST ESTIMATED WORKLOAD: 7.4 METS
STRESS POST EXERCISE DUR MIN: 6 MIN
STRESS POST EXERCISE DUR MIN: 6 MIN
STRESS POST EXERCISE DUR SEC: 8 SEC
STRESS POST O2 SAT PEAK: 98 %
STRESS POST PEAK BP: 140 MMHG
STRESS POST PEAK HR: 173 BPM
STRESS POST PEAK SYSTOLIC BP: 140 MMHG
TARGET HR FORMULA: NORMAL
TEST INDICATION: NORMAL

## 2025-05-02 PROCEDURE — 93018 CV STRESS TEST I&R ONLY: CPT | Performed by: INTERNAL MEDICINE

## 2025-05-02 PROCEDURE — 93016 CV STRESS TEST SUPVJ ONLY: CPT | Performed by: INTERNAL MEDICINE

## 2025-05-02 PROCEDURE — 93017 CV STRESS TEST TRACING ONLY: CPT

## 2025-05-02 NOTE — TELEPHONE ENCOUNTER
----- Message from MECHE Jose sent at 5/2/2025  3:22 PM EDT -----  Please let patient know that exercise stress test she exercised for six minutes and reach target heart rate. She did not have any chest discomfort but testing was stopped due to fatigue. 12 lead EKG did not noted any ischemia. But unfortunately 89-year-old six minutes of exercise is low exercise capacity. Encourage patient to increase her cardiovascular activity and maybe possibly get into a structured program.

## 2025-05-02 NOTE — TELEPHONE ENCOUNTER
I spoke with patient, made aware of results.   She reported that she was having pain in her calves and not fatigue during the stress test.